# Patient Record
Sex: MALE | URBAN - METROPOLITAN AREA
[De-identification: names, ages, dates, MRNs, and addresses within clinical notes are randomized per-mention and may not be internally consistent; named-entity substitution may affect disease eponyms.]

---

## 2020-07-25 ENCOUNTER — TELEPHONE ENCOUNTER (OUTPATIENT)
Dept: URBAN - METROPOLITAN AREA CLINIC 13 | Facility: CLINIC | Age: 70
End: 2020-07-25

## 2020-07-26 ENCOUNTER — TELEPHONE ENCOUNTER (OUTPATIENT)
Dept: URBAN - METROPOLITAN AREA CLINIC 13 | Facility: CLINIC | Age: 70
End: 2020-07-26

## 2023-05-10 ENCOUNTER — OFFICE VISIT (OUTPATIENT)
Dept: URBAN - METROPOLITAN AREA CLINIC 113 | Facility: CLINIC | Age: 73
End: 2023-05-10

## 2023-05-10 NOTE — HPI-TODAY'S VISIT:
73-year-old male with a history of chronic kidney disease stage III, anemia, hyperlipidemia, hyperuricemia, hemiplegia and hemiparesis is referred by Dr. Alona Torres for abdominal pain.  A copy of today's visit will be forwarded to the referring provider. He was originally seen by Dr. Stanley in 2004 for screening colonoscopy.  He does have a family history of colon cancer with his mother diagnosed at age 47 and an uncle diagnosed at age 42.  Screening colonoscopy in December 2006 was performed with difficulty due to multiple diverticula in the colon, significant looping and a tortuous colon.  Quality of the bowel prep was fair.  Exam was notable for diverticulosis and a 5 mm nonbleeding polyp in the sigmoid colon.  Pathology revealed a tubular adenoma.  He was recommended to repeat in 3 years for surveillance.